# Patient Record
Sex: MALE
[De-identification: names, ages, dates, MRNs, and addresses within clinical notes are randomized per-mention and may not be internally consistent; named-entity substitution may affect disease eponyms.]

---

## 2021-11-03 ENCOUNTER — NURSE TRIAGE (OUTPATIENT)
Dept: OTHER | Facility: CLINIC | Age: 21
End: 2021-11-03

## 2021-11-04 NOTE — TELEPHONE ENCOUNTER
Reason for Disposition   Headache  (and neurologic deficit)    Answer Assessment - Initial Assessment Questions  1. SYMPTOM: \"What is the main symptom you are concerned about? \" (e.g., weakness, numbness)      Numbness tingling feeling in toes on bilateral feet. 2. ONSET: \"When did this start? \" (minutes, hours, days; while sleeping)      Onset was after 2000 this evening. 3. LAST NORMAL: \"When was the last time you were normal (no symptoms)? \"      2000 this evening. 4. PATTERN \"Does this come and go, or has it been constant since it started? \"  \"Is it present now? \"      Constant since started    5. CARDIAC SYMPTOMS: \"Have you had any of the following symptoms: chest pain, difficulty breathing, palpitations? \"      Denies any cardiac symptoms. 6. NEUROLOGIC SYMPTOMS: \"Have you had any of the following symptoms: headache, dizziness, vision loss, double vision, changes in speech, unsteady on your feet? \"      Headache coming and going but not severe. Denies any visual changes. No changes in speech or gait. Slightly lightheaded. 7. OTHER SYMPTOMS: \"Do you have any other symptoms? \"      Denies any other symptoms at this time. Protocols used: NEUROLOGIC DEFICIT-ADULT-    This triage is a result of a call to Susanna a Nurse. Please do not respond to the triage nurse through this encounter. Any subsequent communication should be directly with the patient.